# Patient Record
Sex: MALE | Race: WHITE | NOT HISPANIC OR LATINO
[De-identification: names, ages, dates, MRNs, and addresses within clinical notes are randomized per-mention and may not be internally consistent; named-entity substitution may affect disease eponyms.]

---
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
## 2023-06-01 ENCOUNTER — NON-APPOINTMENT (OUTPATIENT)
Age: 18
End: 2023-06-01

## 2023-06-01 ENCOUNTER — APPOINTMENT (OUTPATIENT)
Dept: OPHTHALMOLOGY | Facility: CLINIC | Age: 18
End: 2023-06-01
Payer: COMMERCIAL

## 2023-06-01 PROCEDURE — 92020 GONIOSCOPY: CPT

## 2023-06-01 PROCEDURE — 92004 COMPRE OPH EXAM NEW PT 1/>: CPT

## 2023-06-01 PROCEDURE — 76514 ECHO EXAM OF EYE THICKNESS: CPT

## 2023-06-01 PROCEDURE — 92133 CPTRZD OPH DX IMG PST SGM ON: CPT

## 2025-01-13 ENCOUNTER — NON-APPOINTMENT (OUTPATIENT)
Age: 20
End: 2025-01-13

## 2025-01-13 ENCOUNTER — APPOINTMENT (OUTPATIENT)
Facility: CLINIC | Age: 20
End: 2025-01-13
Payer: COMMERCIAL

## 2025-01-13 PROCEDURE — 92020 GONIOSCOPY: CPT

## 2025-01-13 PROCEDURE — 76514 ECHO EXAM OF EYE THICKNESS: CPT

## 2025-01-13 PROCEDURE — 92014 COMPRE OPH EXAM EST PT 1/>: CPT

## 2025-01-13 PROCEDURE — 92250 FUNDUS PHOTOGRAPHY W/I&R: CPT

## 2025-03-03 ENCOUNTER — NON-APPOINTMENT (OUTPATIENT)
Age: 20
End: 2025-03-03

## 2025-03-03 ENCOUNTER — APPOINTMENT (OUTPATIENT)
Facility: CLINIC | Age: 20
End: 2025-03-03
Payer: COMMERCIAL

## 2025-03-03 PROCEDURE — 92014 COMPRE OPH EXAM EST PT 1/>: CPT

## 2025-03-03 PROCEDURE — 92133 CPTRZD OPH DX IMG PST SGM ON: CPT

## 2025-03-03 PROCEDURE — 76514 ECHO EXAM OF EYE THICKNESS: CPT

## 2025-03-03 PROCEDURE — 92020 GONIOSCOPY: CPT

## 2025-03-03 PROCEDURE — 92083 EXTENDED VISUAL FIELD XM: CPT

## 2025-07-21 ENCOUNTER — APPOINTMENT (OUTPATIENT)
Facility: CLINIC | Age: 20
End: 2025-07-21
Payer: COMMERCIAL

## 2025-07-21 ENCOUNTER — NON-APPOINTMENT (OUTPATIENT)
Age: 20
End: 2025-07-21

## 2025-07-21 PROCEDURE — 92083 EXTENDED VISUAL FIELD XM: CPT

## 2025-07-21 PROCEDURE — 92250 FUNDUS PHOTOGRAPHY W/I&R: CPT

## 2025-07-21 PROCEDURE — 92012 INTRM OPH EXAM EST PATIENT: CPT

## 2025-08-16 ENCOUNTER — NON-APPOINTMENT (OUTPATIENT)
Age: 20
End: 2025-08-16

## 2025-08-18 ENCOUNTER — APPOINTMENT (OUTPATIENT)
Dept: INFECTIOUS DISEASE | Facility: CLINIC | Age: 20
End: 2025-08-18
Payer: SELF-PAY

## 2025-08-18 DIAGNOSIS — Z71.84 ENC FOR HEALTH COUNSELING RELATED TO TRAVEL: ICD-10-CM

## 2025-08-18 PROBLEM — Z00.00 ENCOUNTER FOR PREVENTIVE HEALTH EXAMINATION: Status: ACTIVE | Noted: 2025-08-18

## 2025-08-18 PROCEDURE — 90713 POLIOVIRUS IPV SC/IM: CPT

## 2025-08-18 PROCEDURE — 90619 MENACWY-TT VACCINE IM: CPT

## 2025-08-18 PROCEDURE — 90473 IMMUNE ADMIN ORAL/NASAL: CPT

## 2025-08-18 PROCEDURE — 90690 TYPHOID VACCINE ORAL: CPT

## 2025-08-18 PROCEDURE — 90717 YELLOW FEVER VACCINE SUBQ: CPT

## 2025-08-18 PROCEDURE — 90472 IMMUNIZATION ADMIN EACH ADD: CPT

## 2025-08-18 PROCEDURE — 99402 PREV MED CNSL INDIV APPRX 30: CPT

## 2025-08-18 PROCEDURE — 90471 IMMUNIZATION ADMIN: CPT

## 2025-08-18 RX ORDER — ATOVAQUONE AND PROGUANIL HYDROCHLORIDE 250; 100 MG/1; MG/1
250-100 TABLET, FILM COATED ORAL DAILY
Qty: 20 | Refills: 0 | Status: ACTIVE | COMMUNITY
Start: 2025-08-18 | End: 1900-01-01

## 2025-08-18 RX ORDER — AZITHROMYCIN 250 MG/1
250 TABLET, FILM COATED ORAL
Qty: 4 | Refills: 0 | Status: ACTIVE | COMMUNITY
Start: 2025-08-18 | End: 1900-01-01